# Patient Record
Sex: MALE | Race: WHITE | NOT HISPANIC OR LATINO | Employment: STUDENT | URBAN - METROPOLITAN AREA
[De-identification: names, ages, dates, MRNs, and addresses within clinical notes are randomized per-mention and may not be internally consistent; named-entity substitution may affect disease eponyms.]

---
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
## 2025-03-31 ENCOUNTER — OFFICE VISIT (OUTPATIENT)
Dept: URGENT CARE | Age: 20
End: 2025-03-31
Payer: COMMERCIAL

## 2025-03-31 VITALS
BODY MASS INDEX: 24.91 KG/M2 | TEMPERATURE: 98.6 F | WEIGHT: 178.57 LBS | DIASTOLIC BLOOD PRESSURE: 74 MMHG | OXYGEN SATURATION: 97 % | SYSTOLIC BLOOD PRESSURE: 143 MMHG | RESPIRATION RATE: 18 BRPM | HEART RATE: 84 BPM

## 2025-03-31 DIAGNOSIS — J06.9 VIRAL UPPER RESPIRATORY INFECTION: ICD-10-CM

## 2025-03-31 DIAGNOSIS — H66.91 ACUTE OTITIS MEDIA, RIGHT: Primary | ICD-10-CM

## 2025-03-31 DIAGNOSIS — J45.909 UNCOMPLICATED ASTHMA, UNSPECIFIED ASTHMA SEVERITY, UNSPECIFIED WHETHER PERSISTENT (HHS-HCC): ICD-10-CM

## 2025-03-31 DIAGNOSIS — R05.9 COUGH IN ADULT: ICD-10-CM

## 2025-03-31 LAB
POC RAPID INFLUENZA A: NEGATIVE
POC RAPID INFLUENZA B: NEGATIVE
POC RAPID STREP: NEGATIVE
POC SARS-COV-2 AG BINAX: NORMAL

## 2025-03-31 PROCEDURE — 87811 SARS-COV-2 COVID19 W/OPTIC: CPT | Performed by: SPECIALIST

## 2025-03-31 PROCEDURE — 87880 STREP A ASSAY W/OPTIC: CPT | Performed by: SPECIALIST

## 2025-03-31 PROCEDURE — 87804 INFLUENZA ASSAY W/OPTIC: CPT | Performed by: SPECIALIST

## 2025-03-31 PROCEDURE — 99214 OFFICE O/P EST MOD 30 MIN: CPT | Performed by: SPECIALIST

## 2025-03-31 PROCEDURE — RXMED WILLOW AMBULATORY MEDICATION CHARGE

## 2025-03-31 RX ORDER — PROMETHAZINE HYDROCHLORIDE AND DEXTROMETHORPHAN HYDROBROMIDE 6.25; 15 MG/5ML; MG/5ML
5 SYRUP ORAL 4 TIMES DAILY PRN
Qty: 118 ML | Refills: 0 | Status: SHIPPED | OUTPATIENT
Start: 2025-03-31

## 2025-03-31 RX ORDER — DOXYCYCLINE 100 MG/1
100 CAPSULE ORAL 2 TIMES DAILY
Qty: 14 CAPSULE | Refills: 0 | Status: SHIPPED | OUTPATIENT
Start: 2025-03-31 | End: 2025-04-08

## 2025-03-31 ASSESSMENT — ENCOUNTER SYMPTOMS
SHORTNESS OF BREATH: 0
SORE THROAT: 1
WHEEZING: 0
COUGH: 1
CONSTITUTIONAL NEGATIVE: 1

## 2025-03-31 ASSESSMENT — PAIN SCALES - GENERAL: PAINLEVEL_OUTOF10: 6

## 2025-03-31 NOTE — LETTER
March 31, 2025     Patient: Eloy Olivas   YOB: 2005   Date of Visit: 3/31/2025       To Whom It May Concern:    Eloy Olivas was seen in my clinic on 3/31/2025 at 4:30 pm. Please excuse Eloy for his absence from school on this day until 04/ 02/2025.    If you have any questions or concerns, please don't hesitate to call.         Sincerely,         Sue Vargas MD        CC: No Recipients

## 2025-03-31 NOTE — PROGRESS NOTES
Subjective   Patient ID: Eloy Olivas is a 19 y.o. male. They present today with a chief complaint of No chief complaint on file..    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    No past surgical history on file.     reports that he has never smoked. He has never been exposed to tobacco smoke. He has never used smokeless tobacco. He reports that he does not drink alcohol and does not use drugs.    Review of Systems  Review of Systems   Constitutional: Negative.    HENT:  Positive for congestion and sore throat.    Respiratory:  Positive for cough. Negative for shortness of breath and wheezing.                                   Objective    There were no vitals filed for this visit.  No LMP for male patient.    Physical Exam  Constitutional:       Appearance: Normal appearance.   HENT:      Ears:      Comments: Erythema of  right TM     Nose: Congestion present.   Neurological:      Mental Status: He is alert.         Procedures    Point of Care Test & Imaging Results from this visit  No results found for this visit on 03/31/25.   Imaging  No results found.    Cardiology, Vascular, and Other Imaging  No other imaging results found for the past 2 days      Diagnostic study results (if any) were reviewed by Sue Vargas MD.    Assessment/Plan   Allergies, medications, history, and pertinent labs/EKGs/Imaging reviewed by Sue Vargas MD.     Medical Decision Making   Viral URI and right Otitis Media, will be treated with Doxycycline X 7 days, will continue Albuterol  6 times a day, and will take Xyzal daily.    Orders and Diagnoses  There are  no diagnoses linked to this encounter.    Medical Admin Record      Patient disposition: Home    Electronically signed by Sue Vargas MD  4:42 PM

## 2025-04-01 ENCOUNTER — PHARMACY VISIT (OUTPATIENT)
Dept: PHARMACY | Facility: CLINIC | Age: 20
End: 2025-04-01
Payer: COMMERCIAL